# Patient Record
Sex: FEMALE | Race: WHITE | Employment: FULL TIME
[De-identification: names, ages, dates, MRNs, and addresses within clinical notes are randomized per-mention and may not be internally consistent; named-entity substitution may affect disease eponyms.]

---

## 2017-02-27 ENCOUNTER — OFFICE VISIT (OUTPATIENT)
Dept: OBGYN | Facility: CLINIC | Age: 42
End: 2017-02-27

## 2017-02-27 VITALS
BODY MASS INDEX: 29.94 KG/M2 | SYSTOLIC BLOOD PRESSURE: 118 MMHG | HEIGHT: 64 IN | DIASTOLIC BLOOD PRESSURE: 74 MMHG | WEIGHT: 175.4 LBS

## 2017-02-27 DIAGNOSIS — Z01.419 ENCOUNTER FOR WELL WOMAN EXAM WITH ROUTINE GYNECOLOGICAL EXAM: Primary | ICD-10-CM

## 2017-02-27 DIAGNOSIS — N93.8 DUB (DYSFUNCTIONAL UTERINE BLEEDING): ICD-10-CM

## 2017-02-27 DIAGNOSIS — Z12.39 SCREENING FOR BREAST CANCER: ICD-10-CM

## 2017-02-27 PROCEDURE — 99396 PREV VISIT EST AGE 40-64: CPT | Performed by: OBSTETRICS & GYNECOLOGY

## 2017-02-27 RX ORDER — NORGESTIMATE AND ETHINYL ESTRADIOL 7DAYSX3 28
1 KIT ORAL DAILY
Qty: 28 TABLET | Refills: 12 | Status: SHIPPED | OUTPATIENT
Start: 2017-02-27 | End: 2018-02-26 | Stop reason: SDUPTHER

## 2018-02-26 DIAGNOSIS — N93.8 DUB (DYSFUNCTIONAL UTERINE BLEEDING): ICD-10-CM

## 2018-02-26 RX ORDER — NORGESTIMATE AND ETHINYL ESTRADIOL 7DAYSX3 28
1 KIT ORAL DAILY
Qty: 28 TABLET | Refills: 12 | Status: SHIPPED | OUTPATIENT
Start: 2018-02-26 | End: 2019-03-04 | Stop reason: SDUPTHER

## 2018-04-02 ENCOUNTER — HOSPITAL ENCOUNTER (OUTPATIENT)
Age: 43
Setting detail: SPECIMEN
Discharge: HOME | End: 2018-04-02
Payer: COMMERCIAL

## 2018-04-02 ENCOUNTER — OFFICE VISIT (OUTPATIENT)
Dept: OBGYN | Age: 43
End: 2018-04-02
Payer: COMMERCIAL

## 2018-04-02 VITALS
HEIGHT: 65 IN | BODY MASS INDEX: 25.33 KG/M2 | SYSTOLIC BLOOD PRESSURE: 116 MMHG | DIASTOLIC BLOOD PRESSURE: 70 MMHG | WEIGHT: 152 LBS

## 2018-04-02 DIAGNOSIS — Z12.39 SCREENING FOR BREAST CANCER: ICD-10-CM

## 2018-04-02 DIAGNOSIS — Z01.419 WOMEN'S ANNUAL ROUTINE GYNECOLOGICAL EXAMINATION: Primary | ICD-10-CM

## 2018-04-02 DIAGNOSIS — Z01.419 WOMEN'S ANNUAL ROUTINE GYNECOLOGICAL EXAMINATION: ICD-10-CM

## 2018-04-02 PROBLEM — E11.9 DIABETES MELLITUS TYPE 2, CONTROLLED, WITHOUT COMPLICATIONS (HCC): Status: ACTIVE | Noted: 2017-06-13

## 2018-04-02 PROBLEM — I10 ESSENTIAL HYPERTENSION: Status: ACTIVE | Noted: 2017-05-09

## 2018-04-02 PROBLEM — R73.9 HYPERGLYCEMIA: Status: ACTIVE | Noted: 2017-05-09

## 2018-04-02 PROBLEM — G43.709 CHRONIC MIGRAINE WITHOUT AURA WITHOUT STATUS MIGRAINOSUS, NOT INTRACTABLE: Status: ACTIVE | Noted: 2017-05-09

## 2018-04-02 PROCEDURE — 99396 PREV VISIT EST AGE 40-64: CPT | Performed by: OBSTETRICS & GYNECOLOGY

## 2018-04-02 PROCEDURE — G0145 SCR C/V CYTO,THINLAYER,RESCR: HCPCS

## 2018-04-02 RX ORDER — TOPIRAMATE 100 MG/1
TABLET, FILM COATED ORAL
Refills: 0 | COMMUNITY
Start: 2018-03-30

## 2018-04-02 RX ORDER — QUETIAPINE FUMARATE 50 MG/1
50 TABLET, FILM COATED ORAL
COMMUNITY
Start: 2018-02-22

## 2018-04-02 ASSESSMENT — PATIENT HEALTH QUESTIONNAIRE - PHQ9
1. LITTLE INTEREST OR PLEASURE IN DOING THINGS: 0
SUM OF ALL RESPONSES TO PHQ QUESTIONS 1-9: 0
2. FEELING DOWN, DEPRESSED OR HOPELESS: 0
SUM OF ALL RESPONSES TO PHQ9 QUESTIONS 1 & 2: 0

## 2018-04-17 LAB — CYTOLOGY REPORT: NORMAL

## 2019-03-04 DIAGNOSIS — N93.8 DUB (DYSFUNCTIONAL UTERINE BLEEDING): ICD-10-CM

## 2019-03-05 RX ORDER — NORGESTIMATE AND ETHINYL ESTRADIOL 7DAYSX3 28
KIT ORAL
Qty: 28 TABLET | Refills: 1 | Status: SHIPPED | OUTPATIENT
Start: 2019-03-05 | End: 2019-04-22 | Stop reason: SDUPTHER

## 2019-04-22 ENCOUNTER — OFFICE VISIT (OUTPATIENT)
Dept: OBGYN | Age: 44
End: 2019-04-22
Payer: COMMERCIAL

## 2019-04-22 ENCOUNTER — HOSPITAL ENCOUNTER (OUTPATIENT)
Age: 44
Setting detail: SPECIMEN
Discharge: HOME | End: 2019-04-22
Payer: COMMERCIAL

## 2019-04-22 VITALS
WEIGHT: 149 LBS | DIASTOLIC BLOOD PRESSURE: 60 MMHG | BODY MASS INDEX: 24.83 KG/M2 | HEIGHT: 65 IN | SYSTOLIC BLOOD PRESSURE: 110 MMHG

## 2019-04-22 DIAGNOSIS — Z01.419 WOMEN'S ANNUAL ROUTINE GYNECOLOGICAL EXAMINATION: ICD-10-CM

## 2019-04-22 DIAGNOSIS — Z01.419 WOMEN'S ANNUAL ROUTINE GYNECOLOGICAL EXAMINATION: Primary | ICD-10-CM

## 2019-04-22 DIAGNOSIS — N93.8 DUB (DYSFUNCTIONAL UTERINE BLEEDING): ICD-10-CM

## 2019-04-22 PROCEDURE — 99396 PREV VISIT EST AGE 40-64: CPT | Performed by: OBSTETRICS & GYNECOLOGY

## 2019-04-22 PROCEDURE — G0145 SCR C/V CYTO,THINLAYER,RESCR: HCPCS

## 2019-04-22 RX ORDER — NORGESTIMATE AND ETHINYL ESTRADIOL 7DAYSX3 28
KIT ORAL
Qty: 28 TABLET | Refills: 12 | Status: SHIPPED | OUTPATIENT
Start: 2019-04-22 | End: 2020-04-07 | Stop reason: SDUPTHER

## 2019-04-22 ASSESSMENT — PATIENT HEALTH QUESTIONNAIRE - PHQ9
SUM OF ALL RESPONSES TO PHQ QUESTIONS 1-9: 0
1. LITTLE INTEREST OR PLEASURE IN DOING THINGS: 0
SUM OF ALL RESPONSES TO PHQ QUESTIONS 1-9: 0
SUM OF ALL RESPONSES TO PHQ9 QUESTIONS 1 & 2: 0
2. FEELING DOWN, DEPRESSED OR HOPELESS: 0

## 2019-04-22 NOTE — PROGRESS NOTES
YEARLY PHYSICAL    Date of service: 2019    Shai Ray  Is a 37 y.o.   female    PT's PCP is: Surjit Camargo     : 1975                                             Subjective:       Patient's last menstrual period was 2019 (approximate). Are your menses regular: yes    OB History    Para Term  AB Living   2 2 1 1       SAB TAB Ectopic Molar Multiple Live Births                    # Outcome Date GA Lbr Baron/2nd Weight Sex Delivery Anes PTL Lv   2   35w0d   M CS-Unspec      1 Term  37w0d   M CS-Unspec           Social History     Tobacco Use   Smoking Status Never Smoker   Smokeless Tobacco Never Used        Social History     Substance and Sexual Activity   Alcohol Use Yes       Family History   Problem Relation Age of Onset    Cancer Maternal Grandmother        Allergies: Patient has no known allergies.       Current Outpatient Medications:     Norgestim-Eth Estrad Triphasic 0.18/0.215/0.25 MG-35 MCG TABS, take 1 tablet by mouth once daily, Disp: 28 tablet, Rfl: 12    QUEtiapine (SEROQUEL) 50 MG tablet, Take 50 mg by mouth, Disp: , Rfl:     topiramate (TOPAMAX) 100 MG tablet, take 1 tablet by mouth twice a day, Disp: , Rfl: 0    SUMAtriptan (IMITREX) 100 MG tablet, , Disp: , Rfl:     Social History     Substance and Sexual Activity   Sexual Activity Not on file       Any bleeding or pain with intercourse: No    Last Yearly:  2018    Last pap: 2018    Last HPV: never    Last Mammogram:     Last Julia Redd never    Do you do self breast exams: Yes    Past Medical History:   Diagnosis Date    Migraines        Past Surgical History:   Procedure Laterality Date     SECTION         Family History   Problem Relation Age of Onset    Cancer Maternal Grandmother        Any family history of breast or ovarian cancer: No    Any family history of blood clots: No      Chief Complaint   Patient presents with    Gynecologic Exam          Nurse: JENNY    PE:  Vital Signs  Blood pressure 110/60, height 5' 5\" (1.651 m), weight 149 lb (67.6 kg), last menstrual period 04/08/2019. Labs:    No results found for this visit on 04/22/19. HPI: The patient is here today for a yearly exam.  She is not having any problems or concerns, the birth control pills regulating her cycles well    NoPT denies fever, chills, nausea and vomiting       Objective  Lymphatic:   no lymphadenopathy  Heent:   negative   Cor: regular rate and rhythm, no murmurs              Pul:clear to auscultation bilaterally- no wheezes, rales or rhonchi, normal air movement, no respiratory distress      GI: Abdomen soft, non-tender. BS normal. No masses,  No organomegaly           Extremities: normal strength, tone, and muscle mass   Breasts: Breast:normal appearance, no masses or tenderness, Inspection negative, No nipple retraction or dimpling, No nipple discharge or bleeding, No axillary or supraclavicular adenopathy, Normal to palpation without dominant masses   Pelvic Exam: GENITAL/URINARY:  External Genitalia:  General appearance; normal, Hair distribution; normal, Lesions absent  Vagina:  General appearance normal, Estrogen effect normal, Discharge absent, Lesions absent, Pelvic support normal  Cervix:  General appearance normal, Lesions absent, Discharge absent, Tenderness absent, Enlargement absent, Nodularity absent  Uterus:  Size normal, Contour normal, Position normal, Masses absent, Consistency; normal, Support normal, Tenderness absent  Adenexa:   Masses absent, Tenderness absent, Enlargement absent, Nodularity absent                                      Vaginal discharge: no vaginal discharge      Uterus: normal size, anteverted, mobile, non-tender, normal shape and consistency     Ovaries: Nonenlarged           Over 50% of time spent on counseling and care coordination on: see assessment and plan Assessment and Plan: Routine care        Diagnosis Orders   1. Women's annual routine gynecological examination  PAP SMEAR   2. DUB (dysfunctional uterine bleeding)  Norgestim-Eth Estrad Triphasic 0.18/0.215/0.25 MG-35 MCG TABS       I am having Randee Thomson maintain her SUMAtriptan, QUEtiapine, topiramate, and Norgestim-Eth Estrad Triphasic.

## 2019-04-26 LAB — CYTOLOGY REPORT: NORMAL

## 2020-04-07 RX ORDER — NORGESTIMATE AND ETHINYL ESTRADIOL 7DAYSX3 28
KIT ORAL
Qty: 28 TABLET | Refills: 12 | Status: SHIPPED | OUTPATIENT
Start: 2020-04-07 | End: 2020-07-22 | Stop reason: SDUPTHER

## 2020-07-22 ENCOUNTER — OFFICE VISIT (OUTPATIENT)
Dept: OBGYN | Age: 45
End: 2020-07-22
Payer: COMMERCIAL

## 2020-07-22 RX ORDER — NORGESTIMATE AND ETHINYL ESTRADIOL 7DAYSX3 28
KIT ORAL
Qty: 28 TABLET | Refills: 12 | Status: SHIPPED | OUTPATIENT
Start: 2020-07-22 | End: 2021-08-09

## 2020-07-22 NOTE — PROGRESS NOTES
Mother        Any family history of breast or ovarian cancer: No    Any family history of blood clots: No      Chief Complaint   Patient presents with    Gynecologic Exam          Nurse: RAVEN   . Note patient started her period unable to do Pap smear today.   Please cancel appointment with no charge and reschedule

## 2020-08-05 ENCOUNTER — OFFICE VISIT (OUTPATIENT)
Dept: OBGYN | Age: 45
End: 2020-08-05
Payer: COMMERCIAL

## 2020-08-05 ENCOUNTER — HOSPITAL ENCOUNTER (OUTPATIENT)
Age: 45
Setting detail: SPECIMEN
Discharge: HOME | End: 2020-08-05
Payer: COMMERCIAL

## 2020-08-05 VITALS
WEIGHT: 149 LBS | BODY MASS INDEX: 24.83 KG/M2 | HEIGHT: 65 IN | SYSTOLIC BLOOD PRESSURE: 114 MMHG | DIASTOLIC BLOOD PRESSURE: 72 MMHG

## 2020-08-05 PROCEDURE — G0145 SCR C/V CYTO,THINLAYER,RESCR: HCPCS

## 2020-08-05 PROCEDURE — 99396 PREV VISIT EST AGE 40-64: CPT | Performed by: OBSTETRICS & GYNECOLOGY

## 2020-08-05 RX ORDER — LEVONORGESTREL AND ETHINYL ESTRADIOL 0.15-0.03
1 KIT ORAL DAILY
Qty: 1 PACKET | Refills: 4 | Status: SHIPPED | OUTPATIENT
Start: 2020-08-05 | End: 2021-08-09 | Stop reason: SDUPTHER

## 2020-08-05 NOTE — PROGRESS NOTES
YEARLY PHYSICAL    Date of service: 2020    Nikki Mcdermott  Is a 50 y.o.   female    PT's PCP is: Amanda Marc     : 1975                                             Subjective:       Patient's last menstrual period was 2020. Are your menses regular: yes    OB History    Para Term  AB Living   2 2 1 1       SAB TAB Ectopic Molar Multiple Live Births                    # Outcome Date GA Lbr Baron/2nd Weight Sex Delivery Anes PTL Lv   2   35w0d   M CS-Unspec      1 Term  37w0d   M CS-Unspec           Social History     Tobacco Use   Smoking Status Never Smoker   Smokeless Tobacco Never Used        Social History     Substance and Sexual Activity   Alcohol Use Yes       Family History   Problem Relation Age of Onset    Cancer Maternal Grandmother     No Known Problems Father     No Known Problems Mother        Allergies: Patient has no known allergies.       Current Outpatient Medications:     levonorgestrel-ethinyl estradiol (SEASONALE) 0.15-0.03 MG per tablet, Take 1 tablet by mouth daily, Disp: 1 packet, Rfl: 4    Norgestim-Eth Estrad Triphasic 0.18/0.215/0.25 MG-35 MCG TABS, take 1 tablet by mouth once daily, Disp: 28 tablet, Rfl: 12    QUEtiapine (SEROQUEL) 50 MG tablet, Take 50 mg by mouth, Disp: , Rfl:     topiramate (TOPAMAX) 100 MG tablet, take 1 tablet by mouth twice a day, Disp: , Rfl: 0    SUMAtriptan (IMITREX) 100 MG tablet, , Disp: , Rfl:     Social History     Substance and Sexual Activity   Sexual Activity Not on file       Any bleeding or pain with intercourse: No    Last Yearly:  2019    Last pap: 2019    Last HPV: never    Last Mammogram:  in [facility] never    Do you do self breast exams: Yes    Past Medical History:   Diagnosis Date    Migraines        Past Surgical History:   Procedure Laterality Date     SECTION         Family History   Problem Relation Age of Onset    Cancer Maternal Grandmother     No Known Problems Father     No Known Problems Mother        Any family history of breast or ovarian cancer: No    Any family history of blood clots: No      Chief Complaint   Patient presents with    Gynecologic Exam          Nurse: JENNY    PE:  Vital Signs  Blood pressure 114/72, height 5' 5\" (1.651 m), weight 149 lb (67.6 kg), last menstrual period 07/22/2020. Labs:    No results found for this visit on 08/05/20. HPI: Patient is here today for a yearly exam.  She states that her oral contraceptive pills are not controlling her cycles the way they once did and she is getting frequent headaches now. Stated that when she was on the Seasonale that she did not have headaches and her periods were regular    NoPT denies fever, chills, nausea and vomiting       Objective  Lymphatic:   no lymphadenopathy  Heent:   negative   Cor: regular rate and rhythm, no murmurs              Pul:clear to auscultation bilaterally- no wheezes, rales or rhonchi, normal air movement, no respiratory distress      GI: Abdomen soft, non-tender.  BS normal. No masses,  No organomegaly           Extremities: normal strength, tone, and muscle mass   Breasts: Breast:normal appearance, no masses or tenderness, Inspection negative, No nipple retraction or dimpling, No nipple discharge or bleeding, No axillary or supraclavicular adenopathy, Normal to palpation without dominant masses   Pelvic Exam: GENITAL/URINARY:  External Genitalia:  General appearance; normal, Hair distribution; normal, Lesions absent  Vagina:  General appearance normal, Estrogen effect normal, Discharge absent, Lesions absent, Pelvic support normal  Cervix:  General appearance normal, Lesions absent, Discharge absent, Tenderness absent, Enlargement absent, Nodularity absent  Uterus:  Size normal, Contour normal, Position normal, Masses absent, Consistency; normal, Support normal, Tenderness absent  Adenexa: Masses absent, Tenderness absent, Enlargement absent, Nodularity absent                                      Vaginal discharge: no vaginal discharge      Uterus: normal size, anteverted, mobile, non-tender, normal shape and consistency     Ovaries: Nonenlarged nontender           Over 50% of time spent on counseling and care coordination on: see assessment and plan                        Assessment and Plan: Will change back to Seasonale for this reason. It was discontinued as the patient was skipping cycles and now she wishes to do this. Diagnosis Orders   1. Encounter for well woman exam with routine gynecological exam  PAP SMEAR   2. Irregular menstrual cycle  levonorgestrel-ethinyl estradiol (SEASONALE) 0.15-0.03 MG per tablet       I am having Randee Duong maintain her SUMAtriptan, QUEtiapine, topiramate, Norgestim-Eth Estrad Triphasic, and levonorgestrel-ethinyl estradiol.

## 2020-08-10 LAB — CYTOLOGY REPORT: NORMAL

## 2021-08-09 ENCOUNTER — OFFICE VISIT (OUTPATIENT)
Dept: OBGYN | Age: 46
End: 2021-08-09
Payer: COMMERCIAL

## 2021-08-09 ENCOUNTER — HOSPITAL ENCOUNTER (OUTPATIENT)
Age: 46
Setting detail: SPECIMEN
Discharge: HOME | End: 2021-08-09
Payer: COMMERCIAL

## 2021-08-09 VITALS
SYSTOLIC BLOOD PRESSURE: 108 MMHG | DIASTOLIC BLOOD PRESSURE: 66 MMHG | HEIGHT: 65 IN | WEIGHT: 157 LBS | BODY MASS INDEX: 26.16 KG/M2

## 2021-08-09 DIAGNOSIS — N92.6 IRREGULAR MENSTRUAL CYCLE: ICD-10-CM

## 2021-08-09 DIAGNOSIS — Z12.31 ENCOUNTER FOR SCREENING MAMMOGRAM FOR BREAST CANCER: ICD-10-CM

## 2021-08-09 DIAGNOSIS — Z01.419 WOMEN'S ANNUAL ROUTINE GYNECOLOGICAL EXAMINATION: ICD-10-CM

## 2021-08-09 DIAGNOSIS — Z01.419 WOMEN'S ANNUAL ROUTINE GYNECOLOGICAL EXAMINATION: Primary | ICD-10-CM

## 2021-08-09 PROCEDURE — G0145 SCR C/V CYTO,THINLAYER,RESCR: HCPCS

## 2021-08-09 PROCEDURE — 99396 PREV VISIT EST AGE 40-64: CPT | Performed by: OBSTETRICS & GYNECOLOGY

## 2021-08-09 RX ORDER — LEVONORGESTREL AND ETHINYL ESTRADIOL 0.15-0.03
1 KIT ORAL DAILY
Qty: 1 PACKET | Refills: 4 | Status: SHIPPED | OUTPATIENT
Start: 2021-08-09 | End: 2022-08-15

## 2021-08-09 NOTE — PROGRESS NOTES
YEARLY PHYSICAL    Date of service: 2021    Harley Chance  Is a 48 y.o.   female    PT's PCP is: Varun Heath     : 1975                                             Subjective:       Patient's last menstrual period was 2021 (exact date). Are your menses regular: yes    OB History    Para Term  AB Living   2 2 1 1       SAB TAB Ectopic Molar Multiple Live Births                    # Outcome Date GA Lbr Baron/2nd Weight Sex Delivery Anes PTL Lv   2   35w0d   M CS-Unspec      1 Term  37w0d   M CS-Unspec           Social History     Tobacco Use   Smoking Status Never Smoker   Smokeless Tobacco Never Used        Social History     Substance and Sexual Activity   Alcohol Use Yes       Family History   Problem Relation Age of Onset    Cancer Maternal Grandmother     No Known Problems Father     No Known Problems Mother        Allergies: Patient has no known allergies.       Current Outpatient Medications:     levonorgestrel-ethinyl estradiol (SEASONALE) 0.15-0.03 MG per tablet, Take 1 tablet by mouth daily, Disp: 1 packet, Rfl: 4    QUEtiapine (SEROQUEL) 50 MG tablet, Take 50 mg by mouth, Disp: , Rfl:     topiramate (TOPAMAX) 100 MG tablet, take 1 tablet by mouth twice a day, Disp: , Rfl: 0    SUMAtriptan (IMITREX) 100 MG tablet, , Disp: , Rfl:     Social History     Substance and Sexual Activity   Sexual Activity Yes    Partners: Male       Any bleeding or pain with intercourse: No    Last Yearly:  20    Last pap: 20    Last HPV: never    Last Mammogram:  - [facility] never    Do you do self breast exams: No    Past Medical History:   Diagnosis Date    Migraines        Past Surgical History:   Procedure Laterality Date     SECTION         Family History   Problem Relation Age of Onset    Cancer Maternal Grandmother     No Known Problems Father     No Known Problems Mother        Any family history of breast or ovarian cancer: No    Any family history of blood clots: No      Chief Complaint   Patient presents with    Gynecologic Exam     pt presents for yearly - no issues           Nurse: nataly COBB:  Vital Signs  Blood pressure 108/66, height 5' 5\" (1.651 m), weight 157 lb (71.2 kg), last menstrual period 07/21/2021. Labs:    No results found for this visit on 08/09/21. HPI: Patient is here today for a yearly exam.  She is doing well on the Seasonale and wishes to continue    NoPT denies fever, chills, nausea and vomiting       Objective  Lymphatic:   no lymphadenopathy  Heent:   negative   Cor: regular rate and rhythm, no murmurs              Pul:clear to auscultation bilaterally- no wheezes, rales or rhonchi, normal air movement, no respiratory distress      GI: Abdomen soft, non-tender. BS normal. No masses,  No organomegaly           Extremities: normal strength, tone, and muscle mass   Breasts: Breast:normal appearance, no masses or tenderness, Inspection negative, No nipple retraction or dimpling, No nipple discharge or bleeding, No axillary or supraclavicular adenopathy, Normal to palpation without dominant masses   Pelvic Exam: GENITAL/URINARY:  External Genitalia:  General appearance; normal, Hair distribution; normal, Lesions absent  Vagina:  General appearance normal, Estrogen effect normal, Discharge absent, Lesions absent, Pelvic support normal  Cervix:  General appearance normal, Lesions absent, Discharge absent, Tenderness absent, Enlargement absent, Nodularity absent  Uterus:  Size normal, Contour normal, Position normal, Masses absent, Consistency; normal, Support normal, Tenderness absent  Adenexa:   Masses absent, Tenderness absent, Enlargement absent, Nodularity absent                                      Vaginal discharge: no vaginal discharge      Uterus: normal size, anteverted, mobile, non-tender, normal shape and consistency     Ovaries: Nonenlarged nontender           Over 50% of time spent on counseling and care coordination on: see assessment and plan                        Assessment and Plan: Continue present care        Diagnosis Orders   1. Women's annual routine gynecological examination  PAP SMEAR   2. Encounter for screening mammogram for breast cancer  YANELI GIANCARLO DIGITAL SCREEN BILATERAL   3. Irregular menstrual cycle  levonorgestrel-ethinyl estradiol (SEASONALE) 0.15-0.03 MG per tablet       I am having Randee N. Al Northern maintain her SUMAtriptan, QUEtiapine, topiramate, and levonorgestrel-ethinyl estradiol.

## 2021-08-18 LAB — CYTOLOGY REPORT: NORMAL

## 2022-08-15 DIAGNOSIS — N92.6 IRREGULAR MENSTRUAL CYCLE: ICD-10-CM

## 2022-08-15 RX ORDER — LEVONORGESTREL AND ETHINYL ESTRADIOL 0.15-0.03
KIT ORAL
Qty: 91 TABLET | Refills: 3 | Status: SHIPPED | OUTPATIENT
Start: 2022-08-15

## 2022-09-07 ENCOUNTER — OFFICE VISIT (OUTPATIENT)
Dept: OBGYN | Age: 47
End: 2022-09-07
Payer: COMMERCIAL

## 2022-09-07 ENCOUNTER — HOSPITAL ENCOUNTER (OUTPATIENT)
Age: 47
Setting detail: SPECIMEN
Discharge: HOME | End: 2022-09-07
Payer: COMMERCIAL

## 2022-09-07 VITALS
SYSTOLIC BLOOD PRESSURE: 118 MMHG | WEIGHT: 168 LBS | BODY MASS INDEX: 27.99 KG/M2 | DIASTOLIC BLOOD PRESSURE: 72 MMHG | HEIGHT: 65 IN

## 2022-09-07 DIAGNOSIS — Z12.31 SCREENING MAMMOGRAM, ENCOUNTER FOR: Primary | ICD-10-CM

## 2022-09-07 DIAGNOSIS — Z01.419 WOMEN'S ANNUAL ROUTINE GYNECOLOGICAL EXAMINATION: ICD-10-CM

## 2022-09-07 PROCEDURE — G0145 SCR C/V CYTO,THINLAYER,RESCR: HCPCS

## 2022-09-07 PROCEDURE — 99396 PREV VISIT EST AGE 40-64: CPT | Performed by: OBSTETRICS & GYNECOLOGY

## 2022-09-07 NOTE — PROGRESS NOTES
YEARLY PHYSICAL    Date of service: 2022    Letitia Casas  Is a 50 y.o.   female    PT's PCP is: Roxy Hernandez     : 1975                                             Subjective:       Patient's last menstrual period was 2022 (approximate). Are your menses regular: yes    OB History    Para Term  AB Living   2 2 1 1       SAB IAB Ectopic Molar Multiple Live Births                    # Outcome Date GA Lbr Baron/2nd Weight Sex Delivery Anes PTL Lv   2   35w0d   M CS-Unspec      1 Term  37w0d   M CS-Unspec           Social History     Tobacco Use   Smoking Status Never   Smokeless Tobacco Never        Social History     Substance and Sexual Activity   Alcohol Use Yes       Family History   Problem Relation Age of Onset    Cancer Maternal Grandmother     No Known Problems Father     No Known Problems Mother        Allergies: Patient has no known allergies.       Current Outpatient Medications:     SETLAKIN 0.15-0.03 MG per tablet, take 1 tablet by mouth once daily, Disp: 91 tablet, Rfl: 3    QUEtiapine (SEROQUEL) 50 MG tablet, Take 50 mg by mouth, Disp: , Rfl:     topiramate (TOPAMAX) 100 MG tablet, take 1 tablet by mouth twice a day, Disp: , Rfl: 0    SUMAtriptan (IMITREX) 100 MG tablet, , Disp: , Rfl:     Social History     Substance and Sexual Activity   Sexual Activity Yes    Partners: Male    Birth control/protection: Pill       Any bleeding or pain with intercourse: No    Last Yearly:  21    Last pap: 21    Last HPV: never    Last Mammogram: never    Last Dexascan never    Do you do self breast exams: No    Past Medical History:   Diagnosis Date    Migraines        Past Surgical History:   Procedure Laterality Date     SECTION         Family History   Problem Relation Age of Onset    Cancer Maternal Grandmother     No Known Problems Father     No Known Problems Mother        Any family history of breast or ovarian cancer: No    Any family history of blood clots: No      Chief Complaint   Patient presents with    Gynecologic Exam     Pt presents today for routine gyn exam. Previous pap was 8/9/21. Nurse: LMD  PE:  Vital Signs  Blood pressure 118/72, height 5' 5\" (1.651 m), weight 168 lb (76.2 kg), last menstrual period 08/22/2022. Labs:    No results found for this visit on 09/07/22. HPI: Socrates Meyers, patient is here today for a yearly exam and medication refill    YesPT denies fever, chills, nausea and vomiting       Objective  Lymphatic:   no lymphadenopathy  Heent:   negative   Cor: regular rate and rhythm, no murmurs              Pul:clear to auscultation bilaterally- no wheezes, rales or rhonchi, normal air movement, no respiratory distress      GI: Abdomen soft, non-tender. BS normal. No masses,  No organomegaly           Extremities: normal strength, tone, and muscle mass   Breasts: Breast:normal appearance, no masses or tenderness, Inspection negative, No nipple retraction or dimpling, No nipple discharge or bleeding, No axillary or supraclavicular adenopathy, Normal to palpation without dominant masses   Pelvic Exam: GENITAL/URINARY:  External Genitalia:  General appearance; normal, Hair distribution; normal, Lesions absent  Vagina:  General appearance normal, Estrogen effect normal, Discharge absent, Lesions absent, Pelvic support normal  Cervix:  General appearance normal, Lesions absent, Discharge absent, Tenderness absent, Enlargement absent, Nodularity absent  Uterus:  Size normal, Contour normal, Position normal, Masses absent, Consistency; normal, Support normal, Tenderness absent  Adenexa:   Masses absent, Tenderness absent, Enlargement absent, Nodularity absent                                      Vaginal discharge: no vaginal discharge      Uterus: normal size, anteverted, mobile, non-tender, normal shape and consistency     Ovaries: Nonenlarged nontender Over 50% of time spent on counseling and care coordination on: see assessment and plan                        Assessment and Plan: Routine care        Diagnosis Orders   1. Screening mammogram, encounter for  YANELI GIANCARLO DIGITAL SCREEN BILATERAL      2. Women's annual routine gynecological examination  PAP SMEAR          I am having Randee Reyes maintain her SUMAtriptan, QUEtiapine, topiramate, and Setlakin.

## 2022-09-21 LAB — CYTOLOGY REPORT: NORMAL

## 2022-11-17 ENCOUNTER — TELEPHONE (OUTPATIENT)
Dept: OBGYN | Age: 47
End: 2022-11-17

## 2022-11-17 DIAGNOSIS — N92.6 IRREGULAR MENSTRUAL CYCLE: Primary | ICD-10-CM

## 2022-11-17 RX ORDER — NORETHINDRONE ACETATE AND ETHINYL ESTRADIOL 1MG-20(21)
1 KIT ORAL DAILY
Qty: 1 PACKET | Refills: 12 | Status: SHIPPED | OUTPATIENT
Start: 2022-11-17

## 2022-11-17 NOTE — TELEPHONE ENCOUNTER
Pt insurance has denied her setlakin medication is there another medication you would like to try, or how would you like to proceed?

## 2023-10-11 ENCOUNTER — HOSPITAL ENCOUNTER (OUTPATIENT)
Age: 48
Setting detail: SPECIMEN
Discharge: HOME | End: 2023-10-11
Payer: COMMERCIAL

## 2023-10-11 ENCOUNTER — OFFICE VISIT (OUTPATIENT)
Dept: OBGYN | Age: 48
End: 2023-10-11
Payer: COMMERCIAL

## 2023-10-11 VITALS
DIASTOLIC BLOOD PRESSURE: 74 MMHG | BODY MASS INDEX: 27.82 KG/M2 | WEIGHT: 167 LBS | HEIGHT: 65 IN | SYSTOLIC BLOOD PRESSURE: 124 MMHG

## 2023-10-11 DIAGNOSIS — Z01.419 WOMEN'S ANNUAL ROUTINE GYNECOLOGICAL EXAMINATION: ICD-10-CM

## 2023-10-11 DIAGNOSIS — Z01.419 WOMEN'S ANNUAL ROUTINE GYNECOLOGICAL EXAMINATION: Primary | ICD-10-CM

## 2023-10-11 DIAGNOSIS — N92.6 IRREGULAR MENSTRUAL CYCLE: ICD-10-CM

## 2023-10-11 PROCEDURE — 3074F SYST BP LT 130 MM HG: CPT | Performed by: OBSTETRICS & GYNECOLOGY

## 2023-10-11 PROCEDURE — G0145 SCR C/V CYTO,THINLAYER,RESCR: HCPCS

## 2023-10-11 PROCEDURE — 3078F DIAST BP <80 MM HG: CPT | Performed by: OBSTETRICS & GYNECOLOGY

## 2023-10-11 PROCEDURE — 99396 PREV VISIT EST AGE 40-64: CPT | Performed by: OBSTETRICS & GYNECOLOGY

## 2023-10-11 RX ORDER — NORETHINDRONE ACETATE AND ETHINYL ESTRADIOL 1MG-20(21)
1 KIT ORAL DAILY
Qty: 3 PACKET | Refills: 4 | Status: SHIPPED | OUTPATIENT
Start: 2023-10-11

## 2023-10-11 NOTE — PROGRESS NOTES
YEARLY PHYSICAL    Date of service: 10/11/2023    Refugio Ahmadi  Is a 46 y.o.   female    PT's PCP is: Dede Hummel     : 1975                                             Subjective:       Patient's last menstrual period was 2023 (approximate). Are your menses regular: yes    OB History    Para Term  AB Living   2 2 1 1       SAB IAB Ectopic Molar Multiple Live Births                    # Outcome Date GA Lbr Baron/2nd Weight Sex Delivery Anes PTL Lv   2   35w0d   M CS-Unspec      1 Term  37w0d   M CS-Unspec           Social History     Tobacco Use   Smoking Status Never   Smokeless Tobacco Never        Social History     Substance and Sexual Activity   Alcohol Use Yes       Family History   Problem Relation Age of Onset    Cancer Maternal Grandmother     No Known Problems Father     No Known Problems Mother        Allergies: Patient has no known allergies.       Current Outpatient Medications:     norethindrone-ethinyl estradiol (LOESTRIN FE ) 1-20 MG-MCG per tablet, Take 1 tablet by mouth daily, Disp: 3 packet, Rfl: 4    QUEtiapine (SEROQUEL) 50 MG tablet, Take 1 tablet by mouth, Disp: , Rfl:     topiramate (TOPAMAX) 100 MG tablet, take 1 tablet by mouth twice a day, Disp: , Rfl: 0    SUMAtriptan (IMITREX) 100 MG tablet, , Disp: , Rfl:     Social History     Substance and Sexual Activity   Sexual Activity Yes    Partners: Male    Birth control/protection: Pill       Any bleeding or pain with intercourse: No    Last Yearly:  22    Last pap: 22    Last HPV: never    Last Mammogram: 23    Last [facility] never    Do you do self breast exams: No    Past Medical History:   Diagnosis Date    Migraines        Past Surgical History:   Procedure Laterality Date     SECTION         Family History   Problem Relation Age of Onset    Cancer Maternal Grandmother     No Known Problems Father

## 2023-10-15 LAB — CYTOLOGY REPORT: NORMAL

## 2024-10-14 DIAGNOSIS — N92.6 IRREGULAR MENSTRUAL CYCLE: ICD-10-CM

## 2024-10-14 RX ORDER — NORETHINDRONE ACETATE AND ETHINYL ESTRADIOL 1MG-20(21)
1 KIT ORAL DAILY
Qty: 3 PACKET | Refills: 4 | Status: SHIPPED | OUTPATIENT
Start: 2024-10-14

## 2024-10-17 DIAGNOSIS — N92.6 IRREGULAR MENSTRUAL CYCLE: ICD-10-CM

## 2024-10-17 RX ORDER — NORETHINDRONE ACETATE/ETHINYL ESTRADIOL AND FERROUS FUMARATE 1MG-20(21)
1 KIT ORAL DAILY
Qty: 84 TABLET | Refills: 0 | OUTPATIENT
Start: 2024-10-17

## 2024-11-14 ENCOUNTER — HOSPITAL ENCOUNTER (OUTPATIENT)
Age: 49
Setting detail: SPECIMEN
Discharge: HOME | End: 2024-11-14
Payer: COMMERCIAL

## 2024-11-14 ENCOUNTER — OFFICE VISIT (OUTPATIENT)
Dept: OBGYN | Age: 49
End: 2024-11-14

## 2024-11-14 VITALS
BODY MASS INDEX: 29.16 KG/M2 | DIASTOLIC BLOOD PRESSURE: 76 MMHG | WEIGHT: 175 LBS | HEIGHT: 65 IN | SYSTOLIC BLOOD PRESSURE: 122 MMHG

## 2024-11-14 DIAGNOSIS — L81.9 PIGMENTED SKIN LESION: ICD-10-CM

## 2024-11-14 DIAGNOSIS — Z01.419 WOMEN'S ANNUAL ROUTINE GYNECOLOGICAL EXAMINATION: Primary | ICD-10-CM

## 2024-11-14 DIAGNOSIS — Z01.419 WOMEN'S ANNUAL ROUTINE GYNECOLOGICAL EXAMINATION: ICD-10-CM

## 2024-11-14 DIAGNOSIS — Z12.31 VISIT FOR SCREENING MAMMOGRAM: ICD-10-CM

## 2024-11-14 PROCEDURE — G0145 SCR C/V CYTO,THINLAYER,RESCR: HCPCS

## 2024-11-14 RX ORDER — CALCIUM CARBONATE/VITAMIN D3 500MG-5MCG
TABLET ORAL
COMMUNITY
Start: 2024-10-18

## 2024-11-14 RX ORDER — ATORVASTATIN CALCIUM 20 MG/1
TABLET, FILM COATED ORAL
COMMUNITY

## 2024-11-14 RX ORDER — LIDOCAINE HYDROCHLORIDE 20 MG/ML
2 INJECTION, SOLUTION INFILTRATION; PERINEURAL ONCE
Status: COMPLETED | OUTPATIENT
Start: 2024-11-14 | End: 2024-11-14

## 2024-11-14 RX ORDER — LIDOCAINE HYDROCHLORIDE 20 MG/ML
5 INJECTION, SOLUTION INFILTRATION; PERINEURAL ONCE
Status: SHIPPED | OUTPATIENT
Start: 2024-11-14

## 2024-11-14 RX ADMIN — LIDOCAINE HYDROCHLORIDE 2 ML: 20 INJECTION, SOLUTION INFILTRATION; PERINEURAL at 17:07

## 2024-11-14 NOTE — PROGRESS NOTES
YEARLY PHYSICAL    Date of service: 2024    Randee Bartlett  Is a 49 y.o.   female    PT's PCP is: Saul Borrego DO     : 1975                                             Subjective:       No LMP recorded. (Menstrual status: Not having periods).     Are your menses regular: not applicable    OB History    Para Term  AB Living   2 2 1 1       SAB IAB Ectopic Molar Multiple Live Births                    # Outcome Date GA Lbr Baron/2nd Weight Sex Type Anes PTL Lv   2   35w0d   M CS-Unspec      1 Term  37w0d   M CS-Unspec           Social History     Tobacco Use   Smoking Status Never   Smokeless Tobacco Never        Social History     Substance and Sexual Activity   Alcohol Use Yes       Family History   Problem Relation Age of Onset    Cancer Maternal Grandmother     No Known Problems Father     No Known Problems Mother        Allergies: Patient has no known allergies.      Current Outpatient Medications:     atorvastatin (LIPITOR) 20 MG tablet, , Disp: , Rfl:     OYSCO 500 + D 500-5 MG-MCG TABS, TAKE 1 TABLET BY MOUTH TWICE DAILY (IN THE MORNING AND IN THE EVENING) WITH MEALS, Disp: , Rfl:     Calcium Carb-Cholecalciferol (OYSCO 500+D PO), , Disp: , Rfl:     norethindrone-ethinyl estradiol (LOESTRIN FE ) 1-20 MG-MCG per tablet, Take 1 tablet by mouth daily, Disp: 3 packet, Rfl: 4    QUEtiapine (SEROQUEL) 50 MG tablet, Take 1 tablet by mouth, Disp: , Rfl:     topiramate (TOPAMAX) 100 MG tablet, take 1 tablet by mouth twice a day, Disp: , Rfl: 0    SUMAtriptan (IMITREX) 100 MG tablet, , Disp: , Rfl:     Social History     Substance and Sexual Activity   Sexual Activity Yes    Partners: Male    Birth control/protection: Pill       Any bleeding or pain with intercourse: No    Last Yearly date:  10/11/23    Last pap date : Date of last Cervical Cancer screen (HPV or PAP): 10/11/2023     results:

## 2024-11-19 LAB — SURGICAL PATHOLOGY REPORT: NORMAL

## 2024-12-03 LAB — CYTOLOGY REPORT: NORMAL

## 2025-01-06 ENCOUNTER — TELEPHONE (OUTPATIENT)
Dept: OBGYN | Age: 50
End: 2025-01-06

## 2025-01-06 NOTE — TELEPHONE ENCOUNTER
Pt called asking about getting off her cycle control pill how to do this so she can find out if she is entering menopause or not. Note was reviewed, I did inform pt after being off one month we can order labs and to use secondary contraceptive in meantime such as condoms.

## 2025-02-10 ENCOUNTER — TELEPHONE (OUTPATIENT)
Dept: OBGYN | Age: 50
End: 2025-02-10

## 2025-02-10 NOTE — TELEPHONE ENCOUNTER
Pt of Dr Roberson last seen 11/14/24, at visit stopping cycle control was discussed. Pt stopped 1 month ago the previous cycle control. Pt states she has not had a cycle since going off the pill and would like to know if there are any next steps.

## 2025-05-19 ENCOUNTER — HOSPITAL ENCOUNTER (EMERGENCY)
Age: 50
LOS: 1 days | Discharge: HOME | End: 2025-05-20
Payer: COMMERCIAL

## 2025-05-19 VITALS
OXYGEN SATURATION: 96 % | TEMPERATURE: 97.52 F | SYSTOLIC BLOOD PRESSURE: 144 MMHG | BODY MASS INDEX: 29.1 KG/M2 | DIASTOLIC BLOOD PRESSURE: 108 MMHG | HEART RATE: 85 BPM
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
## 2025-05-19 DIAGNOSIS — S61.411A: ICD-10-CM

## 2025-05-19 DIAGNOSIS — Z23: ICD-10-CM

## 2025-05-19 DIAGNOSIS — S41.112A: Primary | ICD-10-CM

## 2025-05-19 PROCEDURE — 99283 EMERGENCY DEPT VISIT LOW MDM: CPT

## 2025-05-19 PROCEDURE — 90715 TDAP VACCINE 7 YRS/> IM: CPT

## 2025-05-19 PROCEDURE — 90471 IMMUNIZATION ADMIN: CPT

## 2025-05-19 PROCEDURE — 12002 RPR S/N/AX/GEN/TRNK2.6-7.5CM: CPT

## 2025-05-29 ENCOUNTER — HOSPITAL ENCOUNTER (EMERGENCY)
Age: 50
Discharge: HOME | End: 2025-05-29
Payer: COMMERCIAL

## 2025-05-29 VITALS
BODY MASS INDEX: 29.1 KG/M2 | OXYGEN SATURATION: 94 % | HEART RATE: 89 BPM | SYSTOLIC BLOOD PRESSURE: 130 MMHG | TEMPERATURE: 98.78 F | DIASTOLIC BLOOD PRESSURE: 88 MMHG

## 2025-05-29 DIAGNOSIS — L53.8: Primary | ICD-10-CM
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
## 2025-05-29 DIAGNOSIS — Z48.02: ICD-10-CM

## 2025-05-29 PROCEDURE — 99281 EMR DPT VST MAYX REQ PHY/QHP: CPT
